# Patient Record
Sex: MALE | Race: WHITE
[De-identification: names, ages, dates, MRNs, and addresses within clinical notes are randomized per-mention and may not be internally consistent; named-entity substitution may affect disease eponyms.]

---

## 2021-05-26 NOTE — NUR
TO RECOVERY ROOM VIA BED. PT IS ASLEEP. DOPPLER PULSE LEFT FOOT. CLOTH DOT
APPLIED LEFT PEDAL SITE. RIGHT GROIN SITE SOFT NONTENDER.

## 2021-05-26 NOTE — NUR
RIGHT GROIN SITE CLEANED AND DRESSING CHANGED. IV REMOVED INTACT. 2X2,COBAN
AND MANUAL PRESSURE HELD. LEFT PEDAL ACCESS ATTEMPT WITH DRY CLOTH DOT.

## 2021-06-24 NOTE — NUR
PATEINT VERBALIZED UNDERSTANDING OF DISCHARGE INSTRUCTIONS AND PRECAUTIONS.
LEFT GROIN SITE REMAINS SOFT AND NONTENDER. DRESSING DRY AND INTACT.
DISCHARGED VIA WHEEL CHAIR TO WAITING CAR. REVIEW OF DISCHARGE INSTRUCTIONS
GIVEN TO WIFE.

## 2021-06-24 NOTE — NUR
LEFT GROIN SITE REMAINS STABLE DRESSING DRY AND INTACT. PATIENT SITTING UP IN
BED. REVIEWED DISCHARGE INSTRUCTIONS.

## 2023-09-14 ENCOUNTER — HOSPITAL ENCOUNTER (OUTPATIENT)
Dept: HOSPITAL 95 - LAB SHORT | Age: 73
Discharge: HOME | End: 2023-09-14
Attending: INTERNAL MEDICINE
Payer: COMMERCIAL

## 2023-09-14 DIAGNOSIS — D89.2: ICD-10-CM

## 2023-09-14 DIAGNOSIS — D50.0: Primary | ICD-10-CM

## 2023-09-14 LAB
BASOPHILS # BLD AUTO: 0.07 K/MM3 (ref 0–0.23)
BASOPHILS NFR BLD AUTO: 1 % (ref 0–2)
DEPRECATED RDW RBC AUTO: 49.4 FL (ref 35.1–46.3)
EOSINOPHIL # BLD AUTO: 0.13 K/MM3 (ref 0–0.68)
EOSINOPHIL NFR BLD AUTO: 1 % (ref 0–6)
ERYTHROCYTE [DISTWIDTH] IN BLOOD BY AUTOMATED COUNT: 14.3 % (ref 11.7–14.2)
FERRITIN SERPL-MCNC: 292 NG/ML (ref 26–388)
HCT VFR BLD AUTO: 40 % (ref 37–53)
HGB BLD-MCNC: 13.4 G/DL (ref 13.5–17.5)
IMM GRANULOCYTES # BLD AUTO: 0.02 K/MM3 (ref 0–0.1)
IMM GRANULOCYTES NFR BLD AUTO: 0 % (ref 0–1)
LYMPHOCYTES # BLD AUTO: 0.72 K/MM3 (ref 0.84–5.2)
LYMPHOCYTES NFR BLD AUTO: 6 % (ref 21–46)
MCHC RBC AUTO-ENTMCNC: 33.5 G/DL (ref 31.5–36.5)
MCV RBC AUTO: 94 FL (ref 80–100)
MONOCYTES # BLD AUTO: 0.7 K/MM3 (ref 0.16–1.47)
MONOCYTES NFR BLD AUTO: 6 % (ref 4–13)
NEUTROPHILS # BLD AUTO: 9.68 K/MM3 (ref 1.96–9.15)
NEUTROPHILS NFR BLD AUTO: 86 % (ref 41–73)
NRBC # BLD AUTO: 0 K/MM3 (ref 0–0.02)
NRBC BLD AUTO-RTO: 0 /100 WBC (ref 0–0.2)
PLATELET # BLD AUTO: 381 K/MM3 (ref 150–400)
TIBC SERPL-MCNC: 211 UG/DL (ref 250–450)

## 2023-09-18 LAB
ALBUMIN SERPL-MCNC: 3 G/DL (ref 2.9–4.4)
ALBUMIN/GLOB SERPL: 1 {RATIO} (ref 0.7–1.7)
ALPHA1 GLOB SERPL ELPH-MCNC: 0.3 G/DL (ref 0–0.4)
ALPHA2 GLOB SERPL ELPH-MCNC: 0.7 G/DL (ref 0.4–1)
B-GLOBULIN SERPL ELPH-MCNC: 0.8 G/DL (ref 0.7–1.3)
GAMMA GLOB SERPL ELPH-MCNC: 1.3 G/DL (ref 0.4–1.8)
GLOBULIN SER CALC-MCNC: 3.2 G/DL (ref 2.2–3.9)
IGG SERPL-MCNC: 1165 MG/DL (ref 603–1613)
IGM SERPL-MCNC: 48 MG/DL (ref 15–143)
PROT SERPL-MCNC: 6.2 G/DL (ref 6–8.5)